# Patient Record
Sex: MALE | Race: OTHER | Employment: FULL TIME | URBAN - METROPOLITAN AREA
[De-identification: names, ages, dates, MRNs, and addresses within clinical notes are randomized per-mention and may not be internally consistent; named-entity substitution may affect disease eponyms.]

---

## 2017-03-17 PROCEDURE — 84403 ASSAY OF TOTAL TESTOSTERONE: CPT | Performed by: INTERNAL MEDICINE

## 2017-03-17 PROCEDURE — 84402 ASSAY OF FREE TESTOSTERONE: CPT | Performed by: INTERNAL MEDICINE

## 2018-02-06 PROCEDURE — 81001 URINALYSIS AUTO W/SCOPE: CPT | Performed by: INTERNAL MEDICINE

## 2018-02-06 PROCEDURE — 84403 ASSAY OF TOTAL TESTOSTERONE: CPT | Performed by: INTERNAL MEDICINE

## 2018-02-06 PROCEDURE — 84402 ASSAY OF FREE TESTOSTERONE: CPT | Performed by: INTERNAL MEDICINE

## 2018-08-14 PROBLEM — R51.9 HEADACHE DISORDER: Status: ACTIVE | Noted: 2018-08-14

## 2018-09-17 PROCEDURE — 84153 ASSAY OF PSA TOTAL: CPT | Performed by: UROLOGY

## 2019-11-05 ENCOUNTER — HOSPITAL ENCOUNTER (INPATIENT)
Facility: HOSPITAL | Age: 44
LOS: 5 days | Discharge: HOME | DRG: 392 | End: 2019-11-10
Attending: INTERNAL MEDICINE | Admitting: HOSPITALIST
Payer: COMMERCIAL

## 2019-11-05 PROBLEM — K57.92 ACUTE DIVERTICULITIS: Status: ACTIVE | Noted: 2019-11-05

## 2019-11-05 RX ORDER — ONDANSETRON 2 MG/ML
4 INJECTION INTRAMUSCULAR; INTRAVENOUS EVERY 6 HOURS PRN
Status: DISCONTINUED | OUTPATIENT
Start: 2019-11-05 | End: 2019-11-10

## 2019-11-05 RX ORDER — METOCLOPRAMIDE HYDROCHLORIDE 5 MG/ML
10 INJECTION INTRAMUSCULAR; INTRAVENOUS EVERY 8 HOURS PRN
Status: DISCONTINUED | OUTPATIENT
Start: 2019-11-05 | End: 2019-11-10

## 2019-11-05 RX ORDER — METRONIDAZOLE 500 MG/100ML
500 INJECTION, SOLUTION INTRAVENOUS EVERY 8 HOURS
Status: DISCONTINUED | OUTPATIENT
Start: 2019-11-05 | End: 2019-11-08

## 2019-11-05 RX ORDER — MORPHINE SULFATE 4 MG/ML
1 INJECTION, SOLUTION INTRAMUSCULAR; INTRAVENOUS EVERY 2 HOUR PRN
Status: DISCONTINUED | OUTPATIENT
Start: 2019-11-05 | End: 2019-11-10

## 2019-11-05 RX ORDER — ACETAMINOPHEN 325 MG/1
650 TABLET ORAL EVERY 6 HOURS PRN
Status: DISCONTINUED | OUTPATIENT
Start: 2019-11-05 | End: 2019-11-10

## 2019-11-05 RX ORDER — LEVOFLOXACIN 5 MG/ML
750 INJECTION, SOLUTION INTRAVENOUS EVERY 24 HOURS
Status: DISCONTINUED | OUTPATIENT
Start: 2019-11-06 | End: 2019-11-08

## 2019-11-05 RX ORDER — MORPHINE SULFATE 4 MG/ML
2 INJECTION, SOLUTION INTRAMUSCULAR; INTRAVENOUS EVERY 2 HOUR PRN
Status: DISCONTINUED | OUTPATIENT
Start: 2019-11-05 | End: 2019-11-10

## 2019-11-05 RX ORDER — ROSUVASTATIN CALCIUM 20 MG/1
20 TABLET, COATED ORAL NIGHTLY
Status: DISCONTINUED | OUTPATIENT
Start: 2019-11-05 | End: 2019-11-10

## 2019-11-05 RX ORDER — SODIUM CHLORIDE 9 MG/ML
INJECTION, SOLUTION INTRAVENOUS CONTINUOUS
Status: DISCONTINUED | OUTPATIENT
Start: 2019-11-05 | End: 2019-11-10

## 2019-11-05 RX ORDER — MONTELUKAST SODIUM 10 MG/1
10 TABLET ORAL NIGHTLY
Status: DISCONTINUED | OUTPATIENT
Start: 2019-11-05 | End: 2019-11-10

## 2019-11-05 RX ORDER — MORPHINE SULFATE 4 MG/ML
4 INJECTION, SOLUTION INTRAMUSCULAR; INTRAVENOUS EVERY 2 HOUR PRN
Status: DISCONTINUED | OUTPATIENT
Start: 2019-11-05 | End: 2019-11-10

## 2019-11-06 PROCEDURE — 80048 BASIC METABOLIC PNL TOTAL CA: CPT | Performed by: INTERNAL MEDICINE

## 2019-11-06 PROCEDURE — 94640 AIRWAY INHALATION TREATMENT: CPT

## 2019-11-06 PROCEDURE — 85027 COMPLETE CBC AUTOMATED: CPT | Performed by: INTERNAL MEDICINE

## 2019-11-06 RX ORDER — HYDROCODONE BITARTRATE AND ACETAMINOPHEN 10; 325 MG/1; MG/1
1 TABLET ORAL EVERY 4 HOURS PRN
Status: DISCONTINUED | OUTPATIENT
Start: 2019-11-06 | End: 2019-11-10

## 2019-11-06 NOTE — PLAN OF CARE
Problem: Patient/Family Goals  Goal: Patient/Family Long Term Goal  Description  Patient's Long Term Goal: Discharge home    Interventions:  - Pain tolerable  - Tolerating diet  - Return to previous ADLs  - See additional Care Plan goals for specific int Instruct patient on fluid and nutrition restrictions as appropriate  Outcome: Progressing

## 2019-11-06 NOTE — PLAN OF CARE
Pt is A&O x4. VSS and afebrile. O2 sats WNL on room air, lung sounds clear bilaterally. C/o headache pain and abdominal pain at worst 7 on scale from 0 to 10 for each, offering pain meds as needed per [facility]. Abdomen is soft, nondistended and tender.  Bowel so activity  - Obtain nutritional consult as needed  - Establish a toileting routine/schedule  - Consider collaborating with pharmacy to review patient's medication profile  Outcome: Progressing     Problem: METABOLIC/FLUID AND ELECTROLYTES - ADULT  Goal: Nia

## 2019-11-06 NOTE — PLAN OF CARE
NURSING ADMISSION NOTE      Patient admitted via Ambulance  Oriented to room. Safety precautions initiated. Bed in low position. Call light in reach.     Nursing admission navigator completed  Report given to [facility]

## 2019-11-06 NOTE — H&P
[facility] Hospitalist H&P       CC: abdominal pain    PCP: Amber Willoughby MD    History of Present Illness: Pt is a 39 yo with HL, asthma, chronic migraine who is admitted for abdominal pain that started a few days ago to LLQ. Moderate.  No associated n/v 98.3 °F (36.8 °C) (Oral)   Resp 18   Wt 178 lb (80.7 kg)   SpO2 98%   BMI 28.21 kg/m²   General:  Alert, NAD, appears stated age   Head:  Normocephalic, without obvious abnormality, atraumatic. Eyes:  Sclera anicteric,  EOMs intact. Lids wnl.  PE   Ears, prn  -antiemetics  -surgery on consult, appreciate    **HL, asthma, chronic migraine- stable. Continue home meds    **PPx-scds for now    D/w [facility]. records or previous hospital records reviewed.      Further recommendations pending patient'

## 2019-11-06 NOTE — PROGRESS NOTES
Clay Stone is a 38year old male  No chief complaint on file.       HPI: 39 y/o male admitted last nite with sigmoid diverticulitis  This is his first episode  No prior colonoscopy  Feeling better with bowel rest and antibiotics           Past Medical H

## 2019-11-06 NOTE — PLAN OF CARE
Patient AOx4. Complaints of headache. Mild Left lower quadrant pain. Would like to eat, RN explained NPO at present time. POC discussed, all questions and concerns addressed. Will pass onto oncoming RN.      Dr. Amanda South aware of consult will see patient tabatha

## 2019-11-07 PROCEDURE — 85025 COMPLETE CBC W/AUTO DIFF WBC: CPT | Performed by: HOSPITALIST

## 2019-11-07 NOTE — PLAN OF CARE
Problem: Patient/Family Goals  Goal: Patient/Family Long Term Goal  Description  Patient's Long Term Goal: Discharge home    Interventions:  - Pain tolerable  - Tolerating diet  - Return to previous ADLs  - See additional Care Plan goals for specific int Instruct patient on fluid and nutrition restrictions as appropriate  Outcome: Progressing     Problem: PAIN - ADULT  Goal: Verbalizes/displays adequate comfort level or patient's stated pain goal  Description  INTERVENTIONS:  - Encourage pt to monitor pain

## 2019-11-07 NOTE — PLAN OF CARE
VSS,afebrile. Tolerating liquids, + flatus. Reports 7/10 intermittent LLQ pain & cramping; meds prn. Up ad jenny,voiding freely. POC explained, will monitor.      Problem: PAIN - ADULT  Goal: Verbalizes/displays adequate comfort level or patient's stated Short Term Goal  Description  Patient's Short Term Goal: Prepare for discharge home    Interventions:   - Transition from IV to PO pain meds   - Advance diet as tolerated  - Encourage ambulation  - See additional Care Plan goals for specific interventions

## 2019-11-07 NOTE — PROGRESS NOTES
Vss. Pt a&ox3. Pt on ra with 02 sats wnl. Lungs cta. Pt denies difficulty breathing or sob. Pt denies chest pain. Denies n/v this am. Reports he was tolerating clear liquids well yesterday. States he is passing flatus.  Rates pain to left side of abdomen as

## 2019-11-07 NOTE — PROGRESS NOTES
Overall improving    abd soft mild tenderness LLQ    Plan  Continue iv antibiotics  Full liquid diet

## 2019-11-07 NOTE — PROGRESS NOTES
[facility] Hospitalist Progress Note     PCP: Sunitha Cole MD    CC:  Follow up    SUBJECTIVE:  Pt sitting up in bed, pain improved. No n/v/f/c. +U, +flatus.  Tolerated CLD    OBJECTIVE:  Temp:  [98.5 °F (36.9 °C)-98.7 °F (37.1 °C)] 98.5 °F (36.9 °C)  Pulse chronic migraine who is admitted for abdominal pain that started a few days ago to [facility]     **acute diverticulitis with small abscess (see CT a/p in epic)  -empiric IV abx  - IVF  -IV morphine prn, norco prn  -antiemetics  -surgery on consult, appreciate

## 2019-11-08 NOTE — PLAN OF CARE
Pt alert and orientatedx4. Room air. SCDs. Full liquid diet. Passing gas. Voiding. Up ad jenny. IVF infusing. IV antibiotics. Morphine used to control pain. Bowel sounds active. POC discussed with patient. Call light within reach. Will continue to monitor. rhythm and assess patient for signs and symptoms of electrolyte imbalances  - Administer electrolyte replacement as ordered  - Monitor response to electrolyte replacements, including rhythm and repeat lab results as appropriate  - Fluid restriction as orde

## 2019-11-08 NOTE — PROGRESS NOTES
[facility] Hospitalist Progress Note     PCP: Jannette Antonio MD    CC:  Follow up    SUBJECTIVE:  Pt sitting up in bed, still with pain- but says very bad when has flatus or tries to have BM.  No n/v/f/c. +U  Tolerated CLD  Still requiring IV morphine  OBJE diverticulitis      ** 39 yo with HL, asthma, chronic migraine who is admitted for abdominal pain that started a few days ago to [facility].     **acute diverticulitis with small abscess (see CT a/p in epic)  -empiric IV abx  - IVF  -IV morphine prn, norco prn  -

## 2019-11-08 NOTE — PROGRESS NOTES
Vss. Pt a&ox3. Pt a&ox3. Pt on ra with 02 sats wnl. Lungs cta. Pt denies difficulty breathing or sob. Pt denies chest pain. Pt denies n/v. Tolerating full liquids well yesterday.  Pt states he is passing flatus and did have a loose bm yesterday and last nig

## 2019-11-09 PROCEDURE — 85025 COMPLETE CBC W/AUTO DIFF WBC: CPT | Performed by: HOSPITALIST

## 2019-11-09 NOTE — PROGRESS NOTES
[facility] Hospitalist Progress Note     PCP: Chris Cruz MD    CC:  Follow up    SUBJECTIVE:  Pt sitting up in bed, pain improved. +flatus. +U.  No cp/sob/n/v/f/c    OBJECTIVE:  Temp:  [98 °F (36.7 °C)-98.5 °F (36.9 °C)] 98 °F (36.7 °C)  Pulse:  [57-73] migraine who is admitted for abdominal pain that started a few days ago to [facility].     **acute diverticulitis with small abscess (see CT a/p in epic)  -empiric IV abx  - IVF  -IV morphine prn, norco prn  -antiemetics  -surgery on consult, appreciate   -advanc

## 2019-11-09 NOTE — PROGRESS NOTES
Appears to have improved after antibiotic change    abd soft mild tenderness LLQ    Plan soft diet today    Possible d/c tomorrow on augmentin

## 2019-11-09 NOTE — PLAN OF CARE
Problem: GASTROINTESTINAL - ADULT  Goal: Minimal or absence of nausea and vomiting  Description  INTERVENTIONS:  - Maintain adequate hydration with IV or PO as ordered and tolerated  - Nasogastric tube to low intermittent suction as ordered  - Evaluate e distraction and/or relaxation techniques  - Monitor for opioid side effects  - Notify MD/LIP if interventions unsuccessful or patient reports new pain  - Anticipate increased pain with activity and pre-medicate as appropriate  Outcome: Progressing   Restin

## 2019-11-10 VITALS
TEMPERATURE: 98 F | HEART RATE: 76 BPM | BODY MASS INDEX: 28 KG/M2 | WEIGHT: 178 LBS | SYSTOLIC BLOOD PRESSURE: 119 MMHG | RESPIRATION RATE: 18 BRPM | OXYGEN SATURATION: 96 % | DIASTOLIC BLOOD PRESSURE: 73 MMHG

## 2019-11-10 RX ORDER — AMOXICILLIN AND CLAVULANATE POTASSIUM 875; 125 MG/1; MG/1
1 TABLET, FILM COATED ORAL 2 TIMES DAILY
Qty: 14 TABLET | Refills: 0 | Status: SHIPPED | OUTPATIENT
Start: 2019-11-10 | End: 2019-11-17

## 2019-11-10 NOTE — PLAN OF CARE
Problem: Patient/Family Goals  Goal: Patient/Family Long Term Goal  Description  Patient's Long Term Goal: Discharge home    Interventions:  - Pain tolerable  - Tolerating diet  - Return to previous ADLs  - See additional Care Plan goals for specific int appropriate  - Fluid restriction as ordered  - Instruct patient on fluid and nutrition restrictions as appropriate  Outcome: Adequate for Discharge     Problem: PAIN - ADULT  Goal: Verbalizes/displays adequate comfort level or patient's stated pain goal  D

## 2019-11-12 NOTE — DISCHARGE SUMMARY
[facility] Internal Medicine Discharge Summary   Patient ID:  Clay Stone  [id number]  38year old  3/21/1975    Admit date: 11/5/2019    Discharge date and time: 11/1102019     Attending Physician: No att. providers found     Primary Care Physician: Lacie Chavez Tabs  Commonly known as:  AUGMENTIN  Take 1 tablet by mouth 2 (two) times daily for 7 days.         CONTINUE taking these medications    Albuterol Sulfate  (90 Base) MCG/ACT Aers  Commonly known as:  PROAIR HFA  INHALE 2 PUFFS INTO THE LUNGS FOUR JAROD underlying mass lesion including neoplastic process involving the sigmoid colon. Operative Procedures:    Continue with a low residue diet, see information sheet.  If you develop a fever or worsening abdominal pain, call your doctor or return to emerg

## 2021-04-11 DIAGNOSIS — Z23 NEED FOR VACCINATION: ICD-10-CM

## (undated) NOTE — LETTER
To whom It may concern,    Vincenzo Watkins was hospitalized at [address] [place] [address] to return to work on 11/18/19    Judith Ny MD